# Patient Record
Sex: FEMALE | Race: BLACK OR AFRICAN AMERICAN | URBAN - METROPOLITAN AREA
[De-identification: names, ages, dates, MRNs, and addresses within clinical notes are randomized per-mention and may not be internally consistent; named-entity substitution may affect disease eponyms.]

---

## 2023-05-15 ENCOUNTER — LAB OUTSIDE AN ENCOUNTER (OUTPATIENT)
Dept: URBAN - METROPOLITAN AREA CLINIC 40 | Facility: CLINIC | Age: 48
End: 2023-05-15

## 2023-05-15 ENCOUNTER — DASHBOARD ENCOUNTERS (OUTPATIENT)
Age: 48
End: 2023-05-15

## 2023-05-15 ENCOUNTER — OFFICE VISIT (OUTPATIENT)
Dept: URBAN - METROPOLITAN AREA CLINIC 40 | Facility: CLINIC | Age: 48
End: 2023-05-15
Payer: COMMERCIAL

## 2023-05-15 ENCOUNTER — WEB ENCOUNTER (OUTPATIENT)
Dept: URBAN - METROPOLITAN AREA CLINIC 40 | Facility: CLINIC | Age: 48
End: 2023-05-15

## 2023-05-15 VITALS
WEIGHT: 293 LBS | SYSTOLIC BLOOD PRESSURE: 126 MMHG | BODY MASS INDEX: 47.09 KG/M2 | HEART RATE: 129 BPM | DIASTOLIC BLOOD PRESSURE: 74 MMHG | TEMPERATURE: 97.2 F | HEIGHT: 66 IN

## 2023-05-15 DIAGNOSIS — Z12.11 SCREENING FOR COLON CANCER: ICD-10-CM

## 2023-05-15 PROCEDURE — 99202 OFFICE O/P NEW SF 15 MIN: CPT | Performed by: INTERNAL MEDICINE

## 2023-05-15 RX ORDER — ATORVASTATIN CALCIUM, FILM COATED 40 MG/1
TABLET ORAL
Qty: 90 TABLET | Status: ACTIVE | COMMUNITY

## 2023-05-15 RX ORDER — METFORMIN HYDROCHLORIDE 1000 MG/1
TABLET, FILM COATED ORAL
Qty: 180 TABLET | Status: ACTIVE | COMMUNITY

## 2023-05-15 RX ORDER — ONDANSETRON HYDROCHLORIDE 4 MG/1
TABLET, FILM COATED ORAL
Qty: 12 TABLET | Status: ON HOLD | COMMUNITY

## 2023-05-15 RX ORDER — DIAZEPAM 5 MG/1
TABLET ORAL
Qty: 30 TABLET | Status: ON HOLD | COMMUNITY

## 2023-05-15 RX ORDER — ALBUTEROL SULFATE 90 UG/1
AEROSOL, METERED RESPIRATORY (INHALATION)
Qty: 6.7 GRAM | Status: ON HOLD | COMMUNITY

## 2023-05-15 RX ORDER — ERGOCALCIFEROL CAPSULES, 1.25 MG/1
CAPSULE ORAL
Qty: 12 CAPSULE | Status: ACTIVE | COMMUNITY

## 2023-05-15 RX ORDER — LISINOPRIL AND HYDROCHLOROTHIAZIDE TABLETS 20; 12.5 MG/1; MG/1
TABLET ORAL
Qty: 90 TABLET | Status: ACTIVE | COMMUNITY

## 2023-05-15 RX ORDER — METHYLPREDNISOLONE 4 MG/1
TABLET ORAL
Qty: 21 TABLET | Status: ON HOLD | COMMUNITY

## 2023-05-15 RX ORDER — MELOXICAM 15 MG/1
TABLET ORAL
Qty: 30 TABLET | Status: ACTIVE | COMMUNITY

## 2023-05-15 RX ORDER — NYSTATIN 100000 [USP'U]/G
POWDER TOPICAL
Qty: 60 GRAM | Status: ON HOLD | COMMUNITY

## 2023-05-15 RX ORDER — AMLODIPINE BESYLATE 10 MG/1
TABLET ORAL
Qty: 30 TABLET | Status: ACTIVE | COMMUNITY

## 2023-05-15 RX ORDER — FLUOXETINE HYDROCHLORIDE 20 MG/1
CAPSULE ORAL
Qty: 90 CAPSULE | Status: ACTIVE | COMMUNITY

## 2023-05-15 RX ORDER — FERROUS SULFATE TAB 325 MG (65 MG ELEMENTAL FE) 325 (65 FE) MG
TAB ORAL
Qty: 60 TABLET | Status: ACTIVE | COMMUNITY

## 2023-05-15 RX ORDER — FLUCONAZOLE 200 MG/1
TABLET ORAL
Qty: 4 TABLET | Status: ACTIVE | COMMUNITY

## 2023-05-15 NOTE — HPI-TODAY'S VISIT:
46 yo AAF here for screening colonoscopy. NO prior colonoscopy. NO FHx. Denies GI issues. has morbid obesity. has chronic back pain. on iron due to menorrhagia

## 2023-05-15 NOTE — PHYSICAL EXAM CONSTITUTIONAL:
morbid obesity , in no acute distress ,  ambulating without difficulty,  normal communication ability ,

## 2023-08-15 ENCOUNTER — OFFICE VISIT (OUTPATIENT)
Dept: URBAN - METROPOLITAN AREA MEDICAL CENTER 9 | Facility: MEDICAL CENTER | Age: 48
End: 2023-08-15
Payer: COMMERCIAL

## 2023-08-15 DIAGNOSIS — Z12.11 COLON CANCER SCREENING: ICD-10-CM

## 2023-08-15 PROCEDURE — G0121 COLON CA SCRN NOT HI RSK IND: HCPCS | Performed by: INTERNAL MEDICINE
